# Patient Record
Sex: FEMALE | Race: BLACK OR AFRICAN AMERICAN | Employment: UNEMPLOYED | URBAN - METROPOLITAN AREA
[De-identification: names, ages, dates, MRNs, and addresses within clinical notes are randomized per-mention and may not be internally consistent; named-entity substitution may affect disease eponyms.]

---

## 2020-01-01 ENCOUNTER — HOSPITAL ENCOUNTER (EMERGENCY)
Age: 55
End: 2020-12-14
Attending: EMERGENCY MEDICINE
Payer: COMMERCIAL

## 2020-01-01 DIAGNOSIS — I46.9 CARDIAC ARREST (HCC): Primary | ICD-10-CM

## 2020-01-01 PROCEDURE — 74011000250 HC RX REV CODE- 250: Performed by: EMERGENCY MEDICINE

## 2020-01-01 PROCEDURE — 74011250636 HC RX REV CODE- 250/636: Performed by: EMERGENCY MEDICINE

## 2020-01-01 PROCEDURE — 31500 INSERT EMERGENCY AIRWAY: CPT

## 2020-01-01 PROCEDURE — 99284 EMERGENCY DEPT VISIT MOD MDM: CPT

## 2020-01-01 PROCEDURE — 99291 CRITICAL CARE FIRST HOUR: CPT

## 2020-01-01 PROCEDURE — 77030020454 HC TU ET CUF HI/LO COVD -B

## 2020-01-01 PROCEDURE — 87635 SARS-COV-2 COVID-19 AMP PRB: CPT

## 2020-01-01 PROCEDURE — 43753 TX GASTRO INTUB W/ASP: CPT

## 2020-01-01 PROCEDURE — 74011636637 HC RX REV CODE- 636/637: Performed by: EMERGENCY MEDICINE

## 2020-01-01 PROCEDURE — 92950 HEART/LUNG RESUSCITATION CPR: CPT

## 2020-01-01 RX ORDER — CALCIUM CHLORIDE INJECTION 100 MG/ML
INJECTION, SOLUTION INTRAVENOUS
Status: COMPLETED | OUTPATIENT
Start: 2020-01-01 | End: 2020-01-01

## 2020-01-01 RX ORDER — SODIUM BICARBONATE 1 MEQ/ML
SYRINGE (ML) INTRAVENOUS
Status: COMPLETED | OUTPATIENT
Start: 2020-01-01 | End: 2020-01-01

## 2020-01-01 RX ORDER — NALOXONE HYDROCHLORIDE 1 MG/ML
INJECTION INTRAMUSCULAR; INTRAVENOUS; SUBCUTANEOUS
Status: DISCONTINUED
Start: 2020-01-01 | End: 2020-12-15 | Stop reason: HOSPADM

## 2020-01-01 RX ORDER — NALOXONE HYDROCHLORIDE 1 MG/ML
INJECTION INTRAMUSCULAR; INTRAVENOUS; SUBCUTANEOUS
Status: COMPLETED | OUTPATIENT
Start: 2020-01-01 | End: 2020-01-01

## 2020-01-01 RX ORDER — MAGNESIUM SULFATE 1 G/100ML
INJECTION INTRAVENOUS
Status: COMPLETED | OUTPATIENT
Start: 2020-01-01 | End: 2020-01-01

## 2020-01-01 RX ORDER — EPINEPHRINE 0.1 MG/ML
INJECTION INTRACARDIAC; INTRAVENOUS
Status: COMPLETED | OUTPATIENT
Start: 2020-01-01 | End: 2020-01-01

## 2020-01-01 RX ADMIN — EPINEPHRINE 1 MG: 0.1 INJECTION, SOLUTION ENDOTRACHEAL; INTRACARDIAC; INTRAVENOUS at 12:32

## 2020-01-01 RX ADMIN — EPINEPHRINE 1 MG: 0.1 INJECTION, SOLUTION ENDOTRACHEAL; INTRACARDIAC; INTRAVENOUS at 12:43

## 2020-01-01 RX ADMIN — MAGNESIUM SULFATE IN DEXTROSE 1 G: 10 INJECTION, SOLUTION INTRAVENOUS at 12:40

## 2020-01-01 RX ADMIN — EPINEPHRINE 1 MG: 0.1 INJECTION, SOLUTION ENDOTRACHEAL; INTRACARDIAC; INTRAVENOUS at 12:35

## 2020-01-01 RX ADMIN — CALCIUM CHLORIDE 1 G: 100 INJECTION, SOLUTION INTRAVENOUS at 12:33

## 2020-01-01 RX ADMIN — NALOXONE HYDROCHLORIDE 2 MG: 1 INJECTION PARENTERAL at 12:42

## 2020-01-01 RX ADMIN — EPINEPHRINE 1 MG: 0.1 INJECTION, SOLUTION ENDOTRACHEAL; INTRACARDIAC; INTRAVENOUS at 12:46

## 2020-01-01 RX ADMIN — HUMAN INSULIN 10 UNITS: 100 INJECTION, SOLUTION SUBCUTANEOUS at 12:35

## 2020-01-01 RX ADMIN — SODIUM BICARBONATE 50 MEQ: 84 INJECTION INTRAVENOUS at 12:34

## 2020-01-01 RX ADMIN — EPINEPHRINE 1 MG: 0.1 INJECTION, SOLUTION ENDOTRACHEAL; INTRACARDIAC; INTRAVENOUS at 12:39

## 2020-12-14 NOTE — ED PROVIDER NOTES
EMERGENCY DEPARTMENT HISTORY AND PHYSICAL EXAM 
 
Date: 12/14/2020 Patient Name: Domingo Garvin History of Presenting Illness Chief Complaint Patient presents with  Cardiac arrest  
 
 
 
History Provided By: EMS Domingo Garvin is a 63 y.o. female with PMHX of diabetes who presents to the emergency department C/O cardiac arrest.  Patient is intubated and undergoing CPR. History from EMS patient was last heard from approximately 40 minutes ago they arrived on scene and found her in cardiac arrest she was intubated and CPR was initiated they have done 8 rounds of epinephrine and transported to the emergency department. No family with the patient at this time. PCP: No primary care provider on file. Current Facility-Administered Medications Medication Dose Route Frequency Provider Last Rate Last Dose  naloxone (NARCAN) 1 mg/mL injection Past History Past Medical History: No past medical history on file. Past Surgical History: No past surgical history on file. Family History: No family history on file. Social History: 
Social History Tobacco Use  Smoking status: Not on file Substance Use Topics  Alcohol use: Not on file  Drug use: Not on file Allergies: Allergies not on file Review of Systems Review of Systems Unable to perform ROS: Patient unresponsive Physical Exam  
There were no vitals filed for this visit. Physical Exam 
 
Nursing notes and vital signs reviewed Constitutional: severe distress Head: Normocephalic, Atraumatic Eyes: fixed and dialated Neck: Supple Cardiovascular: asystole Chest: Intubated easy bagging and chest excursion bilaterally Lungs: Coarse ausculation bilaterally Abdomen: Soft Back: No evidence of trauma or deformity Extremities: No evidence of trauma or deformity, no LE edema Skin: Warm and dry Neuro: Unresponsive Diagnostic Study Results Labs - 
 No results found for this or any previous visit (from the past 12 hour(s)). Radiologic Studies - No orders to display CT Results  (Last 48 hours) None CXR Results  (Last 48 hours) None Medications given in the ED- Medications  
naloxone (NARCAN) 1 mg/mL injection (has no administration in time range) EPINEPHrine (ADRENALIN) 0.1 mg/mL syringe (1 mg IntraVENous Given 12/14/20 1246) calcium chloride injection (1 g IntraVENous Given 12/14/20 1233) sodium bicarbonate 8.4 % (1 mEq/mL) injection (50 mEq IntraVENous Given 12/14/20 1234) insulin regular (NOVOLIN R, HUMULIN R) injection (10 Units IntraVENous Given 12/14/20 1235)  
magnesium sulfate 1 g/100 ml IVPB (premix or compounded) ( IntraVENous IV Completed 12/14/20 1304)  
naloxone (NARCAN) injection (2 mg IntraVENous Given 12/14/20 1242) Medical Decision Making I am the first provider for this patient. I reviewed the vital signs, available nursing notes, past medical history, past surgical history, family history and social history. Vital Signs-Reviewed the patient's vital signs. Cardiac Monitor: 
Rate: 0 Rhythm: Asystole Records Reviewed: Nursing Notes Provider Notes (Medical Decision Making): Hadley Skinner is a 63 y.o. female who presented today in cardiac arrest.  Patient was brought back and evaluated and found to be in asystole CPR was continued. ET tube was placed by EMS and was easy to bag with bilateral breath sounds. I lead CPR and we followed ACLS guidelines patient was treated with multiple rounds of medication including epinephrine, calcium, magnesium, insulin, bicarb,etc. patient did not have a cardiac effusion on ultrasound no evidence of pneumothorax on ultrasound fluids were infusing. After multiple rounds of CPR and medications patient had cardiac standstill on ultrasound she had fixed and dilated pupils she was asystolic on the monitor and no pulse was palpated. time of death was 200 Procedures: 
Procedures Diagnosis and Disposition 7:00 PM 
I have spent 30 minutes of critical care time involved in lab review, consultations with specialist, family decision-making, and documentation. During this entire length of time I was immediately available to the patient. Critical Care: The reason for providing this level of medical care for this critically ill patient was due a critical illness that impaired one or more vital organ systems such that there was a high probability of imminent or life threatening deterioration in the patients condition. This care involved high complexity decision making to assess, manipulate, and support vital system functions, to treat this degreee vital organ system failure and to prevent further life threatening deterioration of the patients condition. CLINICAL IMPRESSION: 
 
1. Cardiac arrest ([facility] [address]   
 
 
_________________________ Please note that this dictation was completed with [employer], the computer voice recognition software. Quite often unanticipated grammatical, syntax, homophones, and other interpretive errors are inadvertently transcribed by the computer software. Please disregard these errors. Please excuse any errors that have escaped final proofreading.

## 2020-12-14 NOTE — PROGRESS NOTES
Patient received in ER with CPR being performed. RT ambubagged patient with 100% FIO2.  Suctioned pink frothy secretions from ETT

## 2020-12-14 NOTE — PROGRESS NOTES
Bereavement Note: 
 
 responded to the death of Kiran Shah, who is a 63 y.o., female, offering Spiritual Care to patient and family, see flow sheets for interventions. Date of Death: 20 No emergency contact information on file. YES      NO  UNKNOWN Life Net   [x]        []    [] Eye Bank   [x] [] [] Medical Examiner  [x]        []  [] Going to [place]  [x]        [] [] Autopsy   []        []         [x] Sympathy Card  []        [x] Bereavement Materials  []        [x] Business Card Provided  []        [x]  Home: TBD Chaplains will continue to follow family and will provide spiritual care as needed. Sister Zi Rodríguez, [place], [address]  Spiritual Care [phone]

## 2020-12-14 NOTE — ED TRIAGE NOTES
Arrived by EMS from home residence with compressions via [address] [place] [address]. 7.5 ETT tibe in place with assisted BVM ventilations by EMS. Found unresponsive by granddaughter- unknown down time. Epi x8 and Narcan 2mg by EMS PTA. EMS reports FSBS \"HI\". Ambien bottle found @ scene

## 2020-12-15 LAB — SARS-COV-2, COV2NT: DETECTED

## 2020-12-15 NOTE — PROGRESS NOTES
responded to the death of Nikki Zavaleta, who is a 63 y.o., female, Spiritual care was offered to the family by the previous . Family was not available during my visit. See flow sheets for interventions. Date of Death: 2020 Extended Emergency Contact Information Primary Emergency Contact: Armin Lewis Home Phone: [phone] Mobile Phone: [phone] Relation: Daughter YES      NO  UNKNOWN Life Net   [x]        []    [] Eye Bank   [x] [] [] Medical Examiner  [x]        []  [] Going to [place]  [x]        [] [] Autopsy   []        []         [x] Sympathy Card  [x]        [] Bereavement Materials  [x]        [] Business Card Provided  [x]        []  Home: [address] Chaplains will continue to follow family and will provide spiritual care as needed. [address] [facility]  [phone] - OfficeBereavement Note: